# Patient Record
Sex: MALE | Race: WHITE | URBAN - METROPOLITAN AREA
[De-identification: names, ages, dates, MRNs, and addresses within clinical notes are randomized per-mention and may not be internally consistent; named-entity substitution may affect disease eponyms.]

---
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
## 2019-03-03 NOTE — ED PROCEDURE NOTE - NS ED ATTENDING STATEMENT MOD
I have personally seen and examined this patient.  I have fully participated in the care of this patient. I have reviewed all pertinent clinical information, including history, physical exam, plan and the Resident’s note and agree except as noted. 0 = independent

## 2019-07-20 ENCOUNTER — EMERGENCY (EMERGENCY)
Facility: HOSPITAL | Age: 19
LOS: 0 days | Discharge: HOME | End: 2019-07-20
Attending: EMERGENCY MEDICINE | Admitting: EMERGENCY MEDICINE
Payer: MEDICAID

## 2019-07-20 VITALS
OXYGEN SATURATION: 99 % | DIASTOLIC BLOOD PRESSURE: 71 MMHG | WEIGHT: 205.03 LBS | HEART RATE: 86 BPM | RESPIRATION RATE: 18 BRPM | TEMPERATURE: 98 F | SYSTOLIC BLOOD PRESSURE: 123 MMHG
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
## 2019-07-20 DIAGNOSIS — M25.579 PAIN IN UNSPECIFIED ANKLE AND JOINTS OF UNSPECIFIED FOOT: ICD-10-CM
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
## 2019-07-20 DIAGNOSIS — S82.831A OTHER FRACTURE OF UPPER AND LOWER END OF RIGHT FIBULA, INITIAL ENCOUNTER FOR CLOSED FRACTURE: ICD-10-CM

## 2019-07-20 PROCEDURE — 73610 X-RAY EXAM OF ANKLE: CPT | Mod: 26,RT

## 2019-07-20 PROCEDURE — 99284 EMERGENCY DEPT VISIT MOD MDM: CPT | Mod: 25

## 2019-07-20 PROCEDURE — 29515 APPLICATION SHORT LEG SPLINT: CPT

## 2019-07-20 RX ORDER — IBUPROFEN 200 MG
600 TABLET ORAL ONCE
Refills: 0 | Status: COMPLETED | OUTPATIENT
Start: 2019-07-20 | End: 2019-07-20

## 2019-07-20 RX ADMIN — Medication 600 MILLIGRAM(S): at 03:09

## 2019-07-20 NOTE — ED PROVIDER NOTE - OBJECTIVE STATEMENT
19yM accompanied by brother and sister c/o RT ankle pain after inversion injury 1 hour ago; states he was playing basketball jumped and rolled ankle upon landing; has difficulty bearing weight. Pt reports numerous sprains to same ankle, but no fractures. 19yM accompanied by brother and sister c/o RT ankle pain after inversion injury 1 hour ago; states he was playing basketball jumped and rolled ankle upon landing; has difficulty bearing weight but was able to limp after injury. Pt reports numerous sprains to same ankle, but no fractures.

## 2019-07-20 NOTE — ED PEDIATRIC NURSE NOTE - CHIEF COMPLAINT QUOTE
Patient states he was playing basketball at 130 am and rolled his right ankle. Denies falling to the ground or hitting his head

## 2019-07-20 NOTE — ED PROVIDER NOTE - ATTENDING CONTRIBUTION TO CARE
18 yo M with right ankle pain (h/o right ankle sprains in the past), s/p inversion injury just PTA while playing basketball. No numbness/tingling. Unable to bear weight since injury. No knee/tibial pain.    Exam - Gen - NAD, Heart - RRR, no m/g/r, Lungs - CTAB, no w/c/r, Skin - No rash, Extremities - Tender at b/l malleolus, with edema throughout ankle. No tibial/knee tenderness, FROM knee. No ecchymosis. N/V intact. Plan - XR ankle, motrin. XR with possible small distal fibular fracture. Plan - splint, d/c home with ortho f/u.

## 2019-07-20 NOTE — ED PEDIATRIC TRIAGE NOTE - CHIEF COMPLAINT QUOTE
Patient states he was playing basketball at 130 am and rolled his right ankle. Denies falling to the ground or hitting his head Statement Selected

## 2019-07-20 NOTE — ED PROVIDER NOTE - PHYSICAL EXAMINATION
***GEN - NAD; well appearing; A+O x3   ***HEAD - NC/AT   ***EYES/NOSE - PERRL, EOMI, mucous membranes moist, no discharge   ***THROAT: Oral cavity and pharynx normal. No inflammation, swelling, exudate, or lesions.    ***PULMONARY - CTA b/l, symmetric breath sounds. ***CARDIAC -s1s2, RRR, no M,G,R  ***EXTREMITIES - +tenderness over RT inferior medial and lateral malleoli, symmetric pulses, 2+ dp, capillary refill < 2 seconds, no clubbing, no cyanosis  ***SKIN - no rash or bruising   ***NEUROLOGIC - alert, motor 5/5 RLE, sensation intact ***GEN - NAD; well appearing; A+O x3   ***HEAD - NC/AT   ***EYES/NOSE - PERRL, EOMI, mucous membranes moist, no discharge   ***THROAT: Oral cavity and pharynx normal. No inflammation, swelling, exudate, or lesions.    ***PULMONARY - CTA b/l, symmetric breath sounds. ***CARDIAC -s1s2, RRR, no M,G,R  ***EXTREMITIES - +tenderness and edema over RT inferior medial and lateral malleoli; no ecchymosis; symmetric pulses, 2+ dp, capillary refill < 2 seconds, no clubbing, no cyanosis. RT knee non-tender, FROM.   ***SKIN - no rash or bruising   ***NEUROLOGIC - alert, motor 5/5 RLE, sensation intact

## 2019-07-20 NOTE — ED PROVIDER NOTE - CLINICAL SUMMARY MEDICAL DECISION MAKING FREE TEXT BOX
20 yo M with right ankle pain (h/o right ankle sprains in the past), s/p inversion injury just PTA while playing basketball. No numbness/tingling. Unable to bear weight since injury. No knee/tibial pain.    Exam - Gen - NAD, Heart - RRR, no m/g/r, Lungs - CTAB, no w/c/r, Skin - No rash, Extremities - Tender at b/l malleolus, with edema throughout ankle. No tibial/knee tenderness, FROM knee. No ecchymosis. N/V intact. Plan - XR ankle, motrin. XR with possible small distal fibular fracture. Plan - splint, d/c home with ortho f/u.

## 2019-07-20 NOTE — ED PROVIDER NOTE - CARE PROVIDER_API CALL
Eyal Muñoz)  Orthopaedic Surgery  [address] [place] [address]  Phone: [phone]  Fax: [phone]  Follow Up Time:

## 2019-07-20 NOTE — ED PROVIDER NOTE - NS ED ROS FT
Review of Systems:  	•	CONSTITUTIONAL - no fever, no diaphoresis, no weight change  	•	SKIN - no rash  	•	HEMATOLOGIC - no bleeding, no bruising  	•	EYES - no eye pain, no blurred vision  	•	ENT - no change in hearing, no pain  	•	RESPIRATORY - no shortness of breath, no cough  	•	CARDIAC - no chest pain, no palpitations  	•	GI - no abd pain, no nausea, no vomiting, no diarrhea, no constipation, no bleeding  	•	MUSCULOSKELETAL - +RT ankle pain and swelling  	•	NEUROLOGIC - no weakness, no headache, no anesthesia, no paresthesias

## 2022-08-23 NOTE — ED PROVIDER NOTE - NS ED ATTENDING STATEMENT MOD
6 years
I have personally seen and examined this patient.  I have fully participated in the care of this patient. I have reviewed all pertinent clinical information, including history, physical exam, plan and the Resident’s note and agree except as noted.